# Patient Record
(demographics unavailable — no encounter records)

---

## 2025-05-19 NOTE — HISTORY OF PRESENT ILLNESS
[FreeTextEntry1] : 38 year old female presents for wwe.  She has no complaints today.  Menses are every 28 days.  She denies heavy bleeding or cramping.  She is using the withdrawal method for contraception.  She has a history of ovarian cysts and cystic breasts.  She is requesting a rx for a bilateral breast sono.  She denies fibroids, endometriosis, STI's and abnormal pap's.     PMH is significant for ocular migraines.  PSH includes CS x2, ASD repair, appendectomy, ankle surgery, umbilical hernia repair and rectus diastasis repair.  She has no significant family history.  She denies tobacco, alcohol and illicit drugs.  OB history: .  FT CS x2.  Her last delivery was complicated by a PPH.   Gyn history as above.  She is allergic to Biaxin.  She takes mvi.

## 2025-05-19 NOTE — PHYSICAL EXAM
[Chaperoned Physical Exam] : A chaperone was present in the examining room during all aspects of the physical examination. [FreeTextEntry2] : SHONNA Perales [Appropriately responsive] : appropriately responsive [Alert] : alert [No Acute Distress] : no acute distress [No Lymphadenopathy] : no lymphadenopathy [Regular Rate Rhythm] : regular rate rhythm [No Murmurs] : no murmurs [Clear to Auscultation B/L] : clear to auscultation bilaterally [Soft] : soft [Non-tender] : non-tender [Non-distended] : non-distended [No HSM] : No HSM [No Lesions] : no lesions [No Mass] : no mass [Oriented x3] : oriented x3 [Examination Of The Breasts] : a normal appearance [No Masses] : no breast masses were palpable [Labia Majora] : normal [Labia Minora] : normal [Normal] : normal [Uterine Adnexae] : normal

## 2025-05-19 NOTE — PLAN
[FreeTextEntry1] : 38-year-old female for well woman exam  1.  Pap done 2.  Self breast exam reviewed 3. History of breast cysts - requesting rx for bilateral breast sono. 4.  Annual exam in 1 year